# Patient Record
(demographics unavailable — no encounter records)

---

## 2025-05-13 NOTE — REVIEW OF SYSTEMS
[Negative] : Heme/Lymph [FreeTextEntry7] : Right groin scar healing nicely but nodular.  Nontender to palpation.  Not warm.  No obvious drainage appreciated.

## 2025-05-13 NOTE — PLAN
[FreeTextEntry1] : Status post excisional biopsy of a right groin subcutaneous benign hidradenitis April 2024.  He has another recollection in the area.  This is based on prior ultrasound and recent CAT scan of the abdomen pelvis.  He denies any drainage.  But does appreciated because his belt rides over this area.  He presents today to make sure there is no obvious infection based my assessment there is no obvious infection.  I have offered him again to excise this area and he would like to wait.  I think that is appropriate and he can follow-up as needed.

## 2025-05-13 NOTE — REASON FOR VISIT
[Follow-Up: _____] : a [unfilled] follow-up visit [FreeTextEntry1] : Patient presents in follow-up today he is status post excisional biopsy of a subcutaneous benign hidradenitis.  He has been recovering well but has increased masslike nodularity in that area.  Reports that his belt irritates the scar and may have some short lived bleeding.  He denies any drainage from the area.  We have done an ultrasound rating that has demonstrated some collection underneath the surface.  And a recent CAT scan of the area which she had undertaken for kidney stone also demonstrates that the collection is back again.  He also reports that he has had radiation for his prostate in that area as well.  It does not bother him much but he presents today just to make sure that no other issues need to be addressed and there is no infection.  He has been offered a cutaneous aspiration and has declined to do that.

## 2025-05-13 NOTE — PHYSICAL EXAM
[Normal Breath Sounds] : Normal breath sounds [Normal Heart Sounds] : normal heart sounds [No Rash or Lesion] : No rash or lesion [Alert] : alert [Oriented to Person] : oriented to person [Oriented to Place] : oriented to place [Oriented to Time] : oriented to time [Calm] : calm [de-identified] : No acute distress [de-identified] : PERRL EOMI anicteric sclera pink moist oral mucosa [de-identified] : Supple [de-identified] : Right groin scar healing nicely but nodular.  Nontender to palpation.  Not warm.  No obvious drainage appreciated. [de-identified] : Nodular right groin from scar tissue.  No fluctuance appreciated the area is nontender.